# Patient Record
Sex: FEMALE | Race: ASIAN
[De-identification: names, ages, dates, MRNs, and addresses within clinical notes are randomized per-mention and may not be internally consistent; named-entity substitution may affect disease eponyms.]

---

## 2018-01-03 ENCOUNTER — HOSPITAL ENCOUNTER (OUTPATIENT)
Dept: HOSPITAL 89 - OR | Age: 68
Discharge: HOME | End: 2018-01-03
Attending: SURGERY
Payer: COMMERCIAL

## 2018-01-03 VITALS — DIASTOLIC BLOOD PRESSURE: 82 MMHG | SYSTOLIC BLOOD PRESSURE: 123 MMHG

## 2018-01-03 VITALS — SYSTOLIC BLOOD PRESSURE: 102 MMHG | DIASTOLIC BLOOD PRESSURE: 63 MMHG

## 2018-01-03 VITALS — DIASTOLIC BLOOD PRESSURE: 91 MMHG | SYSTOLIC BLOOD PRESSURE: 128 MMHG

## 2018-01-03 VITALS — SYSTOLIC BLOOD PRESSURE: 98 MMHG | DIASTOLIC BLOOD PRESSURE: 61 MMHG

## 2018-01-03 VITALS — HEIGHT: 63 IN | WEIGHT: 129 LBS | BODY MASS INDEX: 22.86 KG/M2

## 2018-01-03 VITALS — DIASTOLIC BLOOD PRESSURE: 75 MMHG | SYSTOLIC BLOOD PRESSURE: 123 MMHG

## 2018-01-03 VITALS — SYSTOLIC BLOOD PRESSURE: 118 MMHG | DIASTOLIC BLOOD PRESSURE: 81 MMHG

## 2018-01-03 VITALS — DIASTOLIC BLOOD PRESSURE: 79 MMHG | SYSTOLIC BLOOD PRESSURE: 127 MMHG

## 2018-01-03 VITALS — SYSTOLIC BLOOD PRESSURE: 118 MMHG | DIASTOLIC BLOOD PRESSURE: 94 MMHG

## 2018-01-03 DIAGNOSIS — D12.5: ICD-10-CM

## 2018-01-03 DIAGNOSIS — Z12.11: Primary | ICD-10-CM

## 2018-01-03 DIAGNOSIS — D12.2: ICD-10-CM

## 2018-01-03 PROCEDURE — 88305 TISSUE EXAM BY PATHOLOGIST: CPT

## 2018-01-03 NOTE — SHORT(OUTPT) DISCHARGE SUMMARY
Discharge Summary


Reason for Hosp/Final Diag:  


(1) Colon cancer screening


Status:  Chronic


Hospital Course & Plan:  Colonoscopy with polypectomy x3 completed without 

problems.





Departure


Discharge to:  Home, Self Care





Discharge Instructions


Home Meds


No Active Prescriptions or Reported Meds


Diet:  Regular


Activity:  As Tolerated


Special Instructions:  


Your colonoscopy was completed without problems and your prep was


excellent.  I removed 3 polyps from your colon.  These were sent to the


pathologist for analysis.  My office will call you in the next week to let


you know what the polyps are and when your next colonoscopy should be


(either 3 or 5 years from now depending on the pathology results).











ULLRICH,JOHN A MD Eron 3, 2018 08:14

## 2018-07-10 ENCOUNTER — HOSPITAL ENCOUNTER (OUTPATIENT)
Dept: HOSPITAL 89 - US | Age: 68
End: 2018-07-10
Attending: NURSE PRACTITIONER
Payer: COMMERCIAL

## 2018-07-10 DIAGNOSIS — Z00.00: Primary | ICD-10-CM

## 2018-07-10 DIAGNOSIS — M81.0: ICD-10-CM

## 2018-07-10 DIAGNOSIS — M85.88: ICD-10-CM

## 2018-07-10 PROCEDURE — 77080 DXA BONE DENSITY AXIAL: CPT

## 2018-07-10 PROCEDURE — 76700 US EXAM ABDOM COMPLETE: CPT

## 2018-07-10 NOTE — RADIOLOGY IMAGING REPORT
[place]AC[place] [facility] [place] 

 

PATIENT NAME: Marianna Thayer

: 1950

MR: [id number]

V: [id number]

EXAM DATE: 

ORDERING PHYSICIAN: DONATO JEAN-BAPTISTE

TECHNOLOGIST: 

 

Location: [facility] - [place]

Patient: Marianna Thayer

: 1950

MRN: [id number]

Visit/Account:[id number]

Date of Sevice:  7/10/2018

 

ACCESSION #: [id number]

 

Technique: ABDOMEN COMPLETE

 

HISTORY: Right upper quadrant pain

 

Comparison: None

 

Technique: Multiple grayscale, color and Doppler sonographic images were obtained for a complete ultr
asound of the abdomen.

 

Findings:

Liver is normal in size, echotexture and measures 11.2 cm in length.  There are lobulations noted wit
hin the capsule.  There is normal hepatopedal portal venous flow.

 

The spleen is enlarged in size maximally measuring 14.8 cm.

 

Gallbladder wall thickness is 2 mm with no evidence of shadowing stone or sludge within the gallbladd
er lumen. Negative sonographic Rodriguez's sign reported by the technologist.

 

Common duct measures 3 mm in maximum diameter with no evidence of shadowing stone.

 

Imaged portions of the pancreas are unremarkable.

 

Abdominal aorta and IVC are patent and unremarkable.

 

The kidneys are normal in size, contour, and echotexture with the right kidney measuring  9.3 cm x 4.
5  cm x 5.2  cm and the left kidney measuring 11.0 cm x  4.5 cm x  5.4 cm. Incidentally noted is an a
nechoic parapelvic cyst within the right kidney.

 

IMPRESSION:

1.  Splenomegaly.

2.  Lobulated capsule of the liver which is nonspecific; however, can be seen in hepatocellular disea
se although the echotexture of the liver appears normal.

 

Report Dictated By: Manoj Gifford DO at 7/10/2018 8:35 AM

 

Report E-Signed By: Manoj Gifford DO  at 7/10/2018 8:45 AM

 

WSN:[id number]

## 2018-07-10 NOTE — RADIOLOGY IMAGING REPORT
FACILITY: [facility] 

 

PATIENT NAME: Marianna Thayer

: 1950

MR: [id number]

V: [id number]

EXAM DATE: 

ORDERING PHYSICIAN: DONATO JEAN-BAPTISTE

TECHNOLOGIST: 

 

Location: [facility] - [place]

Patient: Marianna Thayer

: 1950

MRN: [id number]

Visit/Account:[id number]

Date of Sevice:  7/10/2018

 

ACCESSION #: [id number]

 

DEXA Scan

 

Clinical history:  screening.

 

Comparison: None.

 

LUMBAR SPINE:

The bone mineral density (BMD) measured from L1-L4 correlates with a Z-score of -0.9 and a T-score of
 -2.7 which is osteoporotic as defined by the World Health Organization.  The corresponding risk of f
racture in the lumbar spine is significantly increased compared with a young adult reference populati
on.

 

HIP:

Bone mineral density (BMD) measured in the Left total hip region correlates with a Z-score of        
    -0.6 and a T-score of -1.9.  The T-score of the femoral neck is -2.0.  The lower of the two T sco
res   is osteopenic which is defined as defined by the World Health Organization.  The corresponding 
risk of fracture in the hip is moderately increased compared with a young adult reference population.


 

Bone mineral density (BMD) measured in the Left Femoral Neck region measures 0.764 g/cm?.

 

Impression:

1.  Lumbar spine:  Osteoporotic.

2.  Left Total Hip:  Osteopenic.

 

The next DEXA scan of this patient should include the following sites: L1-L4 and Left hip.

 

FRAX? WHO Fracture Risk Assessment Tool link:

<http://www.shef.ac.uk/FRAX/tool.jsp?locationValue=9>

 

PLEASE NOTE:

1)  The World Health Organization defines low BMD as follows:

                                                                       T-score

                   Normal                                  > -1

                   Osteopenia                           < -1 and  > -2.5

                   Osteoporosis                         < -2.5 without fractures

                   Established osteoporosis       < -2.5 with fractures

2)  In general, you may wish to consider:

                    Diagnosis                  Treatment                       Follow-up DEXA

 

                    Normal BMD            Prevention                      2-3 years

                    Osteopenia                Prevention/therapy         1-2 years

                    Osteoporosis             Therapy                           Yearly

3)  Fracture risk estimated from the T-score is more accurate for vertebral fractures (often spontane
ous) than for hip fractures.

 

 

Report Dictated By: Don Miguel MD at 7/10/2018 9:58 AM

 

Report E-Signed By: Don Miguel MD  at 7/10/2018 10:00 AM

 

WSN:[id number]